# Patient Record
Sex: MALE | Race: BLACK OR AFRICAN AMERICAN | NOT HISPANIC OR LATINO | Employment: FULL TIME | URBAN - METROPOLITAN AREA
[De-identification: names, ages, dates, MRNs, and addresses within clinical notes are randomized per-mention and may not be internally consistent; named-entity substitution may affect disease eponyms.]

---

## 2018-11-30 ENCOUNTER — OFFICE VISIT (OUTPATIENT)
Dept: INTERNAL MEDICINE | Facility: CLINIC | Age: 38
End: 2018-11-30
Payer: COMMERCIAL

## 2018-11-30 VITALS
TEMPERATURE: 99 F | HEART RATE: 56 BPM | DIASTOLIC BLOOD PRESSURE: 70 MMHG | BODY MASS INDEX: 26.15 KG/M2 | WEIGHT: 186.75 LBS | HEIGHT: 71 IN | RESPIRATION RATE: 16 BRPM | SYSTOLIC BLOOD PRESSURE: 104 MMHG

## 2018-11-30 DIAGNOSIS — M54.9 BACK PAIN, UNSPECIFIED BACK LOCATION, UNSPECIFIED BACK PAIN LATERALITY, UNSPECIFIED CHRONICITY: ICD-10-CM

## 2018-11-30 DIAGNOSIS — N52.9 ERECTILE DYSFUNCTION, UNSPECIFIED ERECTILE DYSFUNCTION TYPE: ICD-10-CM

## 2018-11-30 DIAGNOSIS — Z00.00 ANNUAL PHYSICAL EXAM: Primary | ICD-10-CM

## 2018-11-30 PROCEDURE — 90714 TD VACC NO PRESV 7 YRS+ IM: CPT | Mod: S$GLB,,, | Performed by: INTERNAL MEDICINE

## 2018-11-30 PROCEDURE — 90471 IMMUNIZATION ADMIN: CPT | Mod: S$GLB,,, | Performed by: INTERNAL MEDICINE

## 2018-11-30 PROCEDURE — 99385 PREV VISIT NEW AGE 18-39: CPT | Mod: 25,S$GLB,, | Performed by: INTERNAL MEDICINE

## 2018-11-30 PROCEDURE — 99999 PR PBB SHADOW E&M-NEW PATIENT-LVL IV: CPT | Mod: PBBFAC,,, | Performed by: INTERNAL MEDICINE

## 2018-11-30 RX ORDER — SILDENAFIL 100 MG/1
100 TABLET, FILM COATED ORAL DAILY PRN
Qty: 30 TABLET | Refills: 2 | Status: SHIPPED | OUTPATIENT
Start: 2018-11-30 | End: 2019-11-30

## 2018-11-30 RX ORDER — VARENICLINE TARTRATE 1 MG/1
1 TABLET, FILM COATED ORAL 2 TIMES DAILY
Qty: 60 TABLET | Refills: 2 | Status: SHIPPED | OUTPATIENT
Start: 2018-11-30 | End: 2018-12-30

## 2018-11-30 RX ORDER — VARENICLINE TARTRATE 0.5 MG/1
0.5 TABLET, FILM COATED ORAL 2 TIMES DAILY
Qty: 9 TABLET | Refills: 0 | Status: SHIPPED | OUTPATIENT
Start: 2018-11-30

## 2018-11-30 NOTE — PROGRESS NOTES
Subjective:       Patient ID: Chilo Taylor Jr. is a 38 y.o. male.    Chief Complaint: Establish Care and Annual Exam    HPI     38 y.o. male here for annual exam.     Cholesterol: needs  Vaccines: Influenza - refuses; Tetanus - done last a while ago  Sexual Screening:   STD screening: no concern  Eye exam:  Done last 2 months ago  Prostate: no family history of cancer  Colonoscopy: no family history of cancer    Exercise:  No regular exercise, because of work.    Diet:  Mostly home cooked.    He has had ED and premature ejaculation his whole adult life.  He tried kegel exercises.  He went to a urologist with his wife and got checked out.  He cannot keep up with his wife.    History reviewed. No pertinent past medical history.  Past Surgical History:   Procedure Laterality Date    VASECTOMY       Social History     Socioeconomic History    Marital status:      Spouse name: Not on file    Number of children: Not on file    Years of education: Not on file    Highest education level: Not on file   Social Needs    Financial resource strain: Not on file    Food insecurity - worry: Not on file    Food insecurity - inability: Not on file    Transportation needs - medical: Not on file    Transportation needs - non-medical: Not on file   Occupational History    Not on file   Tobacco Use    Smoking status: Current Every Day Smoker     Packs/day: 1.00     Types: Cigarettes    Smokeless tobacco: Never Used   Substance and Sexual Activity    Alcohol use: Yes     Frequency: 2-4 times a month     Drinks per session: 3 or 4     Binge frequency: Less than monthly     Comment: every 2 weeks    Drug use: Yes     Frequency: 7.0 times per week     Types: Marijuana     Comment: daily    Sexual activity: Yes     Partners: Female     Birth control/protection: None     Comment:    Other Topics Concern    Not on file   Social History Narrative    Not on file     Review of patient's allergies  indicates:  No Known Allergies  Chilo Taylor Jr. had no medications administered during this visit.    Review of Systems   Constitutional: Negative for chills, fever and unexpected weight change.   HENT: Negative for congestion, postnasal drip and sore throat.    Eyes: Negative for redness and visual disturbance.   Respiratory: Negative for cough and shortness of breath.    Cardiovascular: Negative for chest pain and palpitations.   Gastrointestinal: Negative for abdominal pain, constipation, diarrhea, nausea and vomiting.   Genitourinary: Negative for dysuria, frequency and hematuria.   Musculoskeletal: Negative for arthralgias and myalgias.   Skin: Negative for color change and rash.   Neurological: Negative for dizziness and headaches.       Objective:      Physical Exam   Constitutional: He is oriented to person, place, and time. He appears well-developed and well-nourished.   HENT:   Head: Normocephalic and atraumatic.   Mouth/Throat: No oropharyngeal exudate.   Eyes: EOM are normal. Pupils are equal, round, and reactive to light. Right eye exhibits no discharge. Left eye exhibits no discharge. No scleral icterus.   Neck: Normal range of motion. Neck supple. No tracheal deviation present. No thyromegaly present.   Cardiovascular: Normal rate, regular rhythm and normal heart sounds. Exam reveals no gallop and no friction rub.   No murmur heard.  Pulmonary/Chest: Effort normal and breath sounds normal. No respiratory distress. He has no wheezes. He has no rales. He exhibits no tenderness.   Abdominal: Soft. Bowel sounds are normal. He exhibits no distension and no mass. There is no tenderness. There is no rebound and no guarding.   Musculoskeletal: Normal range of motion. He exhibits no edema or tenderness.   Neurological: He is alert and oriented to person, place, and time.   Skin: Skin is warm and dry. No rash noted. No erythema. No pallor.   Psychiatric: He has a normal mood and affect. His behavior is  normal.   Vitals reviewed.      Assessment:       1. Annual physical exam    2. Erectile dysfunction, unspecified erectile dysfunction type    3. Back pain, unspecified back location, unspecified back pain laterality, unspecified chronicity        Plan:       1.  Check CBC, CMP, TSH, lipids.  Discussed exercise with patient.  Tetanus vaccine given today.  Refused flu vaccine.  No need for colon or prostate cancer screening at.  2.  Refer to Urology.  Try Viagra 100 mg as needed.  3.  Refer to Sports Medicine.  Check x-ray lumbar spine.

## 2018-12-06 ENCOUNTER — OFFICE VISIT (OUTPATIENT)
Dept: ORTHOPEDICS | Facility: CLINIC | Age: 38
End: 2018-12-06
Payer: COMMERCIAL

## 2018-12-06 VITALS
SYSTOLIC BLOOD PRESSURE: 110 MMHG | BODY MASS INDEX: 26.55 KG/M2 | DIASTOLIC BLOOD PRESSURE: 70 MMHG | WEIGHT: 189.63 LBS | HEIGHT: 71 IN

## 2018-12-06 DIAGNOSIS — M99.03 SOMATIC DYSFUNCTION OF LUMBAR REGION: ICD-10-CM

## 2018-12-06 DIAGNOSIS — M54.59 MECHANICAL LOW BACK PAIN: Primary | ICD-10-CM

## 2018-12-06 DIAGNOSIS — M79.10 MYALGIA: ICD-10-CM

## 2018-12-06 DIAGNOSIS — M99.05 SOMATIC DYSFUNCTION OF PELVIC REGION: ICD-10-CM

## 2018-12-06 DIAGNOSIS — M99.06 SOMATIC DYSFUNCTION OF LOWER EXTREMITY: ICD-10-CM

## 2018-12-06 DIAGNOSIS — M99.04 SACRAL REGION SOMATIC DYSFUNCTION: ICD-10-CM

## 2018-12-06 DIAGNOSIS — M99.02 SOMATIC DYSFUNCTION OF THORACIC REGION: ICD-10-CM

## 2018-12-06 PROCEDURE — 99999 PR PBB SHADOW E&M-EST. PATIENT-LVL II: CPT | Mod: PBBFAC,,, | Performed by: NEUROMUSCULOSKELETAL MEDICINE & OMM

## 2018-12-06 PROCEDURE — 3008F BODY MASS INDEX DOCD: CPT | Mod: CPTII,S$GLB,, | Performed by: NEUROMUSCULOSKELETAL MEDICINE & OMM

## 2018-12-06 PROCEDURE — 98927 OSTEOPATH MANJ 5-6 REGIONS: CPT | Mod: S$GLB,,, | Performed by: NEUROMUSCULOSKELETAL MEDICINE & OMM

## 2018-12-06 PROCEDURE — 99203 OFFICE O/P NEW LOW 30 MIN: CPT | Mod: 25,S$GLB,, | Performed by: NEUROMUSCULOSKELETAL MEDICINE & OMM

## 2018-12-06 PROCEDURE — 97110 THERAPEUTIC EXERCISES: CPT | Mod: GP,S$GLB,, | Performed by: NEUROMUSCULOSKELETAL MEDICINE & OMM

## 2018-12-06 NOTE — PROGRESS NOTES
Subjective:     Chilo Taylor Jr.    Chief Complaint   Patient presents with    Back Pain       HPI    Chilo is coming in today for low back pain that began 3 year(s) ago, referred by Dr. Nelson. He initially injured his back while bending to  a hose. He had severe pain in his back at that time. Since then, he feels it in his back whenever he tries to exercise. Pt. describes the pain as a 0/10 currently, 7-8/10 at times with exercise. It is a sharp achy pain that does not radiate. There was not a fall/injury/ or trauma associated with the onset of symptoms. The pain is better with rest and worse with bending, lifting, working out. Pt. Denies any other musculoskeletal complaints at this time. 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     Review of Systems   Constitutional: Negative for chills and fever.   HENT: Negative for hearing loss and tinnitus.    Eyes: Negative for blurred vision and photophobia.   Respiratory: Negative for cough and shortness of breath.    Cardiovascular: Negative for chest pain and leg swelling.   Gastrointestinal: Negative for abdominal pain, heartburn, nausea and vomiting.   Genitourinary: Negative for dysuria and hematuria.   Musculoskeletal: Positive for back pain, joint pain, myalgias and neck pain. Negative for falls.   Skin: Negative for rash.   Neurological: Negative for dizziness, tingling, focal weakness, weakness and headaches.   Endo/Heme/Allergies: Positive for environmental allergies. Does not bruise/bleed easily.   Psychiatric/Behavioral: Negative for depression. The patient is not nervous/anxious.        PAST MEDICAL HISTORY: History reviewed. No pertinent past medical history.  PAST SURGICAL HISTORY:   Past Surgical History:   Procedure Laterality Date    VASECTOMY       FAMILY HISTORY:   Family History   Problem Relation Age of Onset    Diabetes Mother     Hypertension Mother     Hypertension Father     Hyperlipidemia Father      "Heart disease Paternal Grandmother         MI in her sleep    Cancer Neg Hx     Stroke Neg Hx      SOCIAL HISTORY:   Social History     Socioeconomic History    Marital status:      Spouse name: Not on file    Number of children: Not on file    Years of education: Not on file    Highest education level: Not on file   Social Needs    Financial resource strain: Not on file    Food insecurity - worry: Not on file    Food insecurity - inability: Not on file    Transportation needs - medical: Not on file    Transportation needs - non-medical: Not on file   Occupational History    Not on file   Tobacco Use    Smoking status: Current Every Day Smoker     Packs/day: 1.00     Types: Cigarettes    Smokeless tobacco: Never Used   Substance and Sexual Activity    Alcohol use: Yes     Frequency: 2-4 times a month     Drinks per session: 3 or 4     Binge frequency: Less than monthly     Comment: every 2 weeks    Drug use: Yes     Frequency: 7.0 times per week     Types: Marijuana     Comment: daily    Sexual activity: Yes     Partners: Female     Birth control/protection: None     Comment:    Other Topics Concern    Not on file   Social History Narrative    Not on file       MEDICATIONS:   Current Outpatient Medications:     sildenafil (VIAGRA) 100 MG tablet, Take 1 tablet (100 mg total) by mouth daily as needed for Erectile Dysfunction., Disp: 30 tablet, Rfl: 2    varenicline (CHANTIX) 0.5 MG Tab, Take 1 tablet (0.5 mg total) by mouth 2 (two) times daily., Disp: 9 tablet, Rfl: 0    varenicline (CHANTIX) 1 mg Tab, Take 1 tablet (1 mg total) by mouth 2 (two) times daily., Disp: 60 tablet, Rfl: 2  ALLERGIES: Review of patient's allergies indicates:  No Known Allergies    Office note from Dr. Nelson on 11/30/18 reviewed: referral for sports medicine for low back pain    Objective:     VITAL SIGNS: /70   Ht 5' 10.5" (1.791 m)   Wt 86 kg (189 lb 9.5 oz)   BMI 26.82 kg/m²    General    Nursing " note and vitals reviewed.  Constitutional: He is oriented to person, place, and time. He appears well-developed and well-nourished.   HENT:   Head: Normocephalic and atraumatic.   no nasal discharge, no external ear redness or discharge   Eyes:   EOM is full and smooth  No eye redness or discharge   Neck: Neck supple. No tracheal deviation present.   Cardiovascular: Normal rate.    2+ Radial pulse bilaterally  2+ Dorsalis Pedis pulse bilaterally  No LE edema appreciated   Pulmonary/Chest: Effort normal. No respiratory distress.   Abdominal: He exhibits no distension.   No rigidity   Neurological: He is alert and oriented to person, place, and time. He exhibits normal muscle tone. Coordination normal.   See details below   Psychiatric: He has a normal mood and affect. His behavior is normal.                 MUSCULOSKELETAL EXAM:     Lumbar Spine: right lumbar region    Observation:    Posture:  Upright and Posterior pelvis tilt with loss of lumbar lordosis  No obvious pelvic obliquity while standing.    No edema, erythema, or ecchymosis noted in lumbosacral region.    No midline skin abnormalities.    No atrophy of lower limb musculature.  Leg lengths symmetric.  Gait: Non-antalgic with Neutral ankle mechanics and Neutral medial arch. Gait without trendelenberg, heel walking, toe walking, and tandem walking.    Tenderness:  No tenderness throughout the lumbar spine, iliolumbar region, posterior pelvis.  + tenderness at right QL insertion at TL junction  No tenderness over the sacrum, piriformis, greater/lesser trochanters.  No bony deformities or step-offs palpated.     Strength Testing (* = with pain):  Hip flexion - 5/5 on left and 5/5 on right  Hip extension - 5/5 on left and 5/5 on right  Knee flexion - 5/5 on left and 5/5 on right  Knee extension - 5/5 on left and 5/5 on right  Dorsiflexion - 5/5 on left and 5/5 on right  Plantarflexion - 5/5 on left and 5/5 on right  Great toe extension - 5/5 on left and 5/5 on  right    Special Tests:  Resisted axial rotation - negative on left and negative on right    Seated straight leg raise - negative on left and negative on right  Supine straight leg raise - negative on left and negative on right   Slump test - negative on left and negative on right  Provocation maneuvers exhibit no worsening of symptoms.    KAREN test - negative  FADIR test - negative  Log roll test - negative    Structural Exam:  TART (Tissue texture abnormality, Asymmetry,  Restriction of motion and/or Tenderness) changes:     Thoracic Spine   T1 Neutral   T2 Neutral   T3 Neutral   T4 Neutral   T5 Neutral   T6 Neutral   T7 Neutral   T8 ERS RIGHT   T9 FRS RIGHT   T10 NS-left,R-right   T11 NS-left,R-right   T12 NS-left,R-right   Right QL TTA    Rib cage: neutral     Lumbar Spine   L1 NS-left,R-right   L2 NS-left,R-right   L3 NS-left,R-right   L4 ERS RIGHT   L5 ERS RIGHT     Pelvis:  · Innominate:Neutral  · Pubic bone:Right inferior pubic shear    Sacrum:Right on Left sacral torsion     Lower Extremity:  · Leg lengths symmetric    Location/joint Finding/restriction   Fibular head Neutral   Tibia Neutral   Talocrural joint Bilateral anterior talus    Subtalar Joint Neutral   Cuboid Neutral   Talo-navicular joint Neutral   Navicular-cuneiform joint Left   1st, 2nd, 3rd, 4th, 5th Cuneiform-metatarsal joint Neutral   1st, 2nd, 3rd, 4th, 5th metatarsal Neutral   1st, 2nd, 3rd, 4th, 5th phalange Neutral       Key   F= Flexed   E = Extended   R = Rotated   S = Sidebent   TTA = tissue texture abnormality       Neurovascular Exam:  Reflexes +2/4 and symmetric at the L4 and S1 dermatomes.    Sensation intact to light touch in the L2-S2 dermatomes bilaterally.   Babinski response downgoing bilaterally.  No pretibial edema or abnormal hair pattern of the shin.    Intact and symmetric DP and PT pulses bilaterally.      Assessment:      Encounter Diagnoses   Name Primary?    Mechanical low back pain Yes    Myalgia     Somatic  dysfunction of thoracic region     Somatic dysfunction of lumbar region     Sacral region somatic dysfunction     Somatic dysfunction of pelvic region     Somatic dysfunction of lower extremity           Plan:      1. Mechanical low back pain with compensatory muscle firing  - OMT performed today and HEP started.   - Educated patient on proper lifting mechanics    2. OMT 5-6 regions. Oral consent obtained.  Reviewed benefits and potential side effects.   - OMT indicated today due to signs and symptoms as well as local and remote somatic dysfunction findings and their related neurokinetic, lymphatic, fascial and/or arteriovenous body connections.   - OMT techniques used: Myofascial Release, Muscle Energy and Articulatory   - Treatment was tolerated well. Improvement noted in segmental mobility post-treatment in dysfunctional regions. There were no adverse events and no complications immediately following treatment.     3. Pt. Given the following HEP:  A)  Pelvic clock exercises given to do from the 6-12 o'clock positions:10-15 reps, twice daily. Hand out of exercise also given.   B) Seated anterior pelvic tilt exercise: rotate pelvis forward to sit on ischial tuberosities while maintaining neutral shoulder positioning. Repeat frequently throughout the day.   C) Quadratus lumborum self-stretch on all fours: hold stretch for 30 seconds, repeating 2-3 times on each side. Do stretch twice daily. Hand-out also given.     [address] HOME EXERCISE PROGRAM (HEP):  The patient was taught a homegoing physical therapy regimen as described above. The patient demonstrated understanding of the exercises and proper technique of their execution. This interaction took 15 minutes.     4. Follow-up in 2 weeks for reevaluation    5. Patient agreeable to today's plan and all questions were answered

## 2018-12-06 NOTE — LETTER
December 7, 2018      Misael Nelson MD  [address] [facility] [place] [address]           [facility]  [address] [facility] [address]  Phone: [phone]          Patient: Chilo Taylor Jr.   MR Number: [id number]   YOB: 1980   Date of Visit: 12/6/2018       Dear Dr. Misael Nelson:    Thank you for referring Chilo Taylor to me for evaluation. Attached you will find relevant portions of my assessment and plan of care.    If you have questions, please do not hesitate to call me. I look forward to following Chilo Taylor along with you.    Sincerely,    Zuleyka Berger, DO    Enclosure  CC:  No Recipients    If you would like to receive this communication electronically, please contact [email or web address] or [phone] to request more information on [employer] Link access.    For providers and/or their staff who would like to refer a patient to [facility], please contact us through our one-stop-shop provider referral line, [facility], at [phone].    If you feel you have received this communication in error or would no longer like to receive these types of communications, please e-mail [email or web address]

## 2018-12-17 ENCOUNTER — OFFICE VISIT (OUTPATIENT)
Dept: UROLOGY | Facility: CLINIC | Age: 38
End: 2018-12-17
Payer: COMMERCIAL

## 2018-12-17 VITALS — HEIGHT: 71 IN | BODY MASS INDEX: 26.23 KG/M2 | WEIGHT: 187.38 LBS

## 2018-12-17 DIAGNOSIS — F52.4 PREMATURE EJACULATION: Primary | ICD-10-CM

## 2018-12-17 PROCEDURE — 99999 PR PBB SHADOW E&M-EST. PATIENT-LVL III: CPT | Mod: PBBFAC,,, | Performed by: PHYSICIAN ASSISTANT

## 2018-12-17 PROCEDURE — 99204 OFFICE O/P NEW MOD 45 MIN: CPT | Mod: S$GLB,,, | Performed by: PHYSICIAN ASSISTANT

## 2018-12-17 PROCEDURE — 3008F BODY MASS INDEX DOCD: CPT | Mod: CPTII,S$GLB,, | Performed by: PHYSICIAN ASSISTANT

## 2018-12-17 RX ORDER — PAROXETINE HYDROCHLORIDE 20 MG/1
20 TABLET, FILM COATED ORAL
Qty: 15 TABLET | Refills: 11 | Status: SHIPPED | OUTPATIENT
Start: 2018-12-17 | End: 2019-12-17

## 2018-12-17 NOTE — LETTER
December 17, 2018      Misael Nelson MD  [address] [facility] [place] [address]           [facility] 4th Floor  [address]  [facility] [address]  Phone: [phone]          Patient: Chilo Taylor Jr.   MR Number: [id number]   YOB: 1980   Date of Visit: 12/17/2018       Dear Dr. Misael Nelson:    Thank you for referring Chilo Taylor to me for evaluation. Attached you will find relevant portions of my assessment and plan of care.    If you have questions, please do not hesitate to call me. I look forward to following Chilo Taylor along with you.    Sincerely,    Migdalia Davis PA-C    Enclosure  CC:  No Recipients    If you would like to receive this communication electronically, please contact [email or web address] or [phone] to request more information on [employer] Link access.    For providers and/or their staff who would like to refer a patient to [facility], please contact us through our one-stop-shop provider referral line, [facility], at [phone].    If you feel you have received this communication in error or would no longer like to receive these types of communications, please e-mail [email or web address]

## 2018-12-17 NOTE — PROGRESS NOTES
CHIEF COMPLAINT:    Mr. Taylor is a 38 y.o. male presenting for premature ejaculation.    PRESENTING ILLNESS:    Chilo Taylor Jr. is a 38 y.o. male  who presents for ED and premature ejaculation.   His symptoms started when he was 24 or 25.  He saw a urologist at that time.  No treatment was offered at that time.  He thought that it would improve over time but it hasnt.  He got a prescription for viagra but he did not fill it due to the cost.    He is able to achieve an erection that is hard enough for penetration.  After 2 minutes of intercourse he ejaculates.  He has not taken anything for premature ejaculation.    He had a vasectomy in 2005.  Testosterone (11/30/18): 525  He is smoker.    He denies HTN and DM.    He has high triglycerides and low HDL, lipid panel otherwise normal (11/30/18).    He does not have any urinary complaints.  He denies hesitancy, intermittency and dysuria.        REVIEW OF SYSTEMS:    Constitutional: Negative for fever and chills.   HENT: Negative for hearing loss.   Eyes: Negative for visual disturbance.   Respiratory: Negative for shortness of breath.   Cardiovascular: Negative for chest pain.   Gastrointestinal: Negative for vomiting, and constipation.   Genitourinary: See HPI  Neurological: Negative for dizziness.   Hematological: Does not bruise/bleed easily.   Psychiatric/Behavioral: Negative for confusion.       PATIENT HISTORY:    History reviewed. No pertinent past medical history.    Past Surgical History:   Procedure Laterality Date    VASECTOMY         Family History   Problem Relation Age of Onset    Diabetes Mother     Hypertension Mother     Hypertension Father     Hyperlipidemia Father     Heart disease Paternal Grandmother         MI in her sleep    Cancer Neg Hx     Stroke Neg Hx        Social History     Socioeconomic History    Marital status:      Spouse name: Not on file    Number of children: Not on file    Years of education: Not on  file    Highest education level: Not on file   Social Needs    Financial resource strain: Not on file    Food insecurity - worry: Not on file    Food insecurity - inability: Not on file    Transportation needs - medical: Not on file    Transportation needs - non-medical: Not on file   Occupational History    Not on file   Tobacco Use    Smoking status: Current Every Day Smoker     Packs/day: 1.00     Types: Cigarettes    Smokeless tobacco: Never Used   Substance and Sexual Activity    Alcohol use: Yes     Frequency: 2-4 times a month     Drinks per session: 3 or 4     Binge frequency: Less than monthly     Comment: every 2 weeks    Drug use: Yes     Frequency: 7.0 times per week     Types: Marijuana     Comment: daily    Sexual activity: Yes     Partners: Female     Birth control/protection: None     Comment:    Other Topics Concern    Not on file   Social History Narrative    Not on file       Allergies:  Patient has no known allergies.    Medications:    Current Outpatient Medications:     paroxetine (PAXIL) 20 MG tablet, Take 1 tablet (20 mg total) by mouth as needed. Take 1 tablet by mouth 3-4 hours before intercourse., Disp: 15 tablet, Rfl: 11    sildenafil (VIAGRA) 100 MG tablet, Take 1 tablet (100 mg total) by mouth daily as needed for Erectile Dysfunction., Disp: 30 tablet, Rfl: 2    varenicline (CHANTIX) 0.5 MG Tab, Take 1 tablet (0.5 mg total) by mouth 2 (two) times daily., Disp: 9 tablet, Rfl: 0    varenicline (CHANTIX) 1 mg Tab, Take 1 tablet (1 mg total) by mouth 2 (two) times daily., Disp: 60 tablet, Rfl: 2    PHYSICAL EXAMINATION:    Constitutional: He appears well-developed and well-nourished.  He is in no apparent distress.    Eyes: No scleral icterus noted bilaterally. No discharge bilaterally.    Nose: No rhinorrhea    Cardiovascular: Normal rate.  No pitting edema noted in lower extremities bilaterally    Pulmonary/Chest: Effort normal. No respiratory distress.      Abdominal:  He exhibits no distension.  There is no CVA tenderness.     Lymphadenopathy:        Right: No supraclavicular adenopathy present.        Left: No supraclavicular adenopathy present.     Neurological: He is alert and oriented to person, place, and time.     Skin: Skin is warm and dry.     Psych: Cooperative with normal affect.    Physical Exam      LABS:    No results found for: PSA, PSADIAG, PSATOTAL, PSAFREE, PSAFREEPCT    IMPRESSION:    Encounter Diagnoses   Name Primary?    Premature ejaculation Yes         PLAN:    Discussed factors that affect erectile dysfunction including low testosterone, obesity, HLD, HTN, DM and smoking.  Trial of paxil. Patient informed that it is a low dose anti depressant. Side effects discussed.      Follow up in 6 weeks         Migdalia Davis PA-C

## 2019-02-26 ENCOUNTER — OFFICE VISIT (OUTPATIENT)
Dept: URGENT CARE | Facility: CLINIC | Age: 39
End: 2019-02-26
Payer: COMMERCIAL

## 2019-02-26 VITALS
WEIGHT: 175 LBS | HEART RATE: 73 BPM | HEIGHT: 71 IN | BODY MASS INDEX: 24.5 KG/M2 | RESPIRATION RATE: 18 BRPM | SYSTOLIC BLOOD PRESSURE: 127 MMHG | DIASTOLIC BLOOD PRESSURE: 76 MMHG | OXYGEN SATURATION: 99 % | TEMPERATURE: 98 F

## 2019-02-26 DIAGNOSIS — S09.90XA TRAUMATIC INJURY OF HEAD, INITIAL ENCOUNTER: Primary | ICD-10-CM

## 2019-02-26 PROCEDURE — 99214 PR OFFICE/OUTPT VISIT, EST, LEVL IV, 30-39 MIN: ICD-10-PCS | Mod: S$GLB,,, | Performed by: NURSE PRACTITIONER

## 2019-02-26 PROCEDURE — 3008F PR BODY MASS INDEX (BMI) DOCUMENTED: ICD-10-PCS | Mod: CPTII,S$GLB,, | Performed by: NURSE PRACTITIONER

## 2019-02-26 PROCEDURE — 99214 OFFICE O/P EST MOD 30 MIN: CPT | Mod: S$GLB,,, | Performed by: NURSE PRACTITIONER

## 2019-02-26 PROCEDURE — 3008F BODY MASS INDEX DOCD: CPT | Mod: CPTII,S$GLB,, | Performed by: NURSE PRACTITIONER

## 2019-02-26 RX ORDER — ONDANSETRON 4 MG/1
4 TABLET, ORALLY DISINTEGRATING ORAL EVERY 6 HOURS PRN
Qty: 20 TABLET | Refills: 0 | Status: SHIPPED | OUTPATIENT
Start: 2019-02-26

## 2019-02-26 NOTE — PROGRESS NOTES
"Subjective:       Patient ID: Chilo Taylor Jr. is a 38 y.o. male.    Vitals:  height is 5' 11" (1.803 m) and weight is 79.4 kg (175 lb). His temperature is 98.4 °F (36.9 °C). His blood pressure is 127/76 and his pulse is 73. His respiration is 18 and oxygen saturation is 99%.     Chief Complaint: Concussion    Patient was drinking heavily Sunday and fell and hit his head, patient doesn't remember anything after the fact. Doesn't recall last night or Sunday night and slept all day yesterday. He can't keep down fluids.  He states he is still not back to full cognitive functioning.      Head Injury    The incident occurred 2 days ago. The injury mechanism was a fall. Length of episode of loss of consciousness: Patient doesn't know if he lost consciousness. There was no blood loss. Associated symptoms include disorientation and vomiting. He has tried nothing for the symptoms.       Constitution: Positive for chills and fatigue.   Gastrointestinal: Positive for vomiting.   Neurological: Positive for disorientation.   Psychiatric/Behavioral: Positive for disorientation and confusion.       Objective:      Physical Exam   Constitutional: He is oriented to person, place, and time. He appears well-developed and well-nourished. He is cooperative.  Non-toxic appearance. He does not appear ill. No distress.   HENT:   Head: Normocephalic and atraumatic. Head is without abrasion, without contusion and without laceration.   Right Ear: Hearing, tympanic membrane, external ear and ear canal normal. No hemotympanum.   Left Ear: Hearing, tympanic membrane, external ear and ear canal normal. No hemotympanum.   Nose: Nose normal. No mucosal edema, rhinorrhea or nasal deformity. No epistaxis. Right sinus exhibits no maxillary sinus tenderness and no frontal sinus tenderness. Left sinus exhibits no maxillary sinus tenderness and no frontal sinus tenderness.   Mouth/Throat: Uvula is midline, oropharynx is clear and moist and " mucous membranes are normal. No trismus in the jaw. Normal dentition. No uvula swelling. No posterior oropharyngeal erythema.   Eyes: Conjunctivae, EOM and lids are normal. Pupils are equal, round, and reactive to light. Right eye exhibits no discharge. Left eye exhibits no discharge. No scleral icterus.   Sclera clear bilat   Neck: Trachea normal, normal range of motion, full passive range of motion without pain and phonation normal. Neck supple. No spinous process tenderness and no muscular tenderness present. No neck rigidity. No tracheal deviation present.   Cardiovascular: Normal rate, regular rhythm, normal heart sounds, intact distal pulses and normal pulses.   Pulmonary/Chest: Effort normal and breath sounds normal. No respiratory distress.   Abdominal: Soft. Normal appearance and bowel sounds are normal. He exhibits no distension, no pulsatile midline mass and no mass. There is no tenderness.   Musculoskeletal: Normal range of motion. He exhibits no edema or deformity.   Neurological: He is alert and oriented to person, place, and time. He has normal strength. No cranial nerve deficit or sensory deficit. He exhibits normal muscle tone. He displays no seizure activity. Coordination normal. GCS eye subscore is 4. GCS verbal subscore is 5. GCS motor subscore is 6.   Skin: Skin is warm, dry and intact. Capillary refill takes less than 2 seconds. No abrasion, no bruising, no burn, no ecchymosis and no laceration noted. He is not diaphoretic. No pallor.   Psychiatric: He has a normal mood and affect. His speech is normal and behavior is normal. Judgment and thought content normal. Cognition and memory are normal.   Nursing note and vitals reviewed.      Assessment:       1. Traumatic injury of head, initial encounter        Plan:         Traumatic injury of head, initial encounter  -     CT Head W Wo Contrast; Future; Expected date: 02/26/2019    Other orders  -     ondansetron (ZOFRAN-ODT) 4 MG TbDL; Take 1  "tablet (4 mg total) by mouth every 6 (six) hours as needed.  Dispense: 20 tablet; Refill: 0      Neurologically intact.  Pt is not disoriented and is aox4 but he "feels off."  Will send for CT scan as pt is within 48 hors of incident.  Observation at home instructions below given to his wife.        Discharge Instructions for Concussion  You have been diagnosed with a concussion, a type of brain injury caused by a sudden impact to your head. It can also be caused by sudden movement of your brain inside your head, such as from forceful shaking. Some concussions are mild. Most people recover completely from mild concussions. But recovery may take days, weeks, or months. For some, symptoms may last even longer. Early care and monitoring are important to prevent long-term complications.  Home care  Do's and don'ts:   · Ask a friend or family member to stay with you for a few days. You should not be alone until you know how the injury has affected you.  · Tell your caregiver to wake you every 2 to 3 hours during the first night. Your caregiver should call 911 if he or she cant wake you, or if you are confused.  · Dont take any medicine--not even aspirin--unless your healthcare provider says it's OK. If you have a headache, try placing a cold, damp cloth on your forehead.  · Eat light. Clear liquids, such as broth or gelatin, are a good choice.  · Don't drink alcohol or use any recreational drugs.   · Don't return to sports or any activity that could cause you to hit your head until all symptoms are gone and you have been cleared by your doctor. A second head injury before full recovery from the first one can lead to serious brain injury.  · Avoid activities that require a lot of concentration or attention. This will allow your brain to rest and heal more quickly.  The best way to recover is to discuss symptoms with your healthcare provider and your family. Work closely with your healthcare provider and give your brain " time to heal.  Follow-up care  Follow up with your healthcare provider, or as advised.     When to call your healthcare provider  Your caregiver should call 911 right away if you have fallen asleep, cannot be awakened, or you are confused.  Otherwise, call your healthcare provider right away if any of these occur:  · Vomiting  · Clear or bloody drainage from your nose or ear  · Constant drowsiness or trouble waking up  · Confusion or memory loss  · Blurred vision  · Trouble walking, talking, or concentrating  · Increased weakness or problems with coordination  · Constant headache that cant be relieved or gets worse  · Changes in behavior or personality   Date Last Reviewed: 11/5/2015  © 7225-9893 [employer]. [address] [place] [address], [place] [address]. All rights reserved. This information is not intended as a substitute for professional medical care. Always follow your healthcare professional's instructions.  Please return here or go to the Emergency Department for any concerns or worsening of condition.  If you were prescribed antibiotics, please take them to completion.  If you were prescribed a narcotic medication, do not drive or operate heavy equipment or machinery while taking these medications.  Please follow up with your primary care doctor or specialist as needed.    If you  smoke, please stop smoking.

## 2019-02-26 NOTE — PATIENT INSTRUCTIONS
Discharge Instructions for Concussion  You have been diagnosed with a concussion, a type of brain injury caused by a sudden impact to your head. It can also be caused by sudden movement of your brain inside your head, such as from forceful shaking. Some concussions are mild. Most people recover completely from mild concussions. But recovery may take days, weeks, or months. For some, symptoms may last even longer. Early care and monitoring are important to prevent long-term complications.  Home care  Do's and don'ts:   · Ask a friend or family member to stay with you for a few days. You should not be alone until you know how the injury has affected you.  · Tell your caregiver to wake you every 2 to 3 hours during the first night. Your caregiver should call 911 if he or she cant wake you, or if you are confused.  · Dont take any medicine--not even aspirin--unless your healthcare provider says it's OK. If you have a headache, try placing a cold, damp cloth on your forehead.  · Eat light. Clear liquids, such as broth or gelatin, are a good choice.  · Don't drink alcohol or use any recreational drugs.   · Don't return to sports or any activity that could cause you to hit your head until all symptoms are gone and you have been cleared by your doctor. A second head injury before full recovery from the first one can lead to serious brain injury.  · Avoid activities that require a lot of concentration or attention. This will allow your brain to rest and heal more quickly.  The best way to recover is to discuss symptoms with your healthcare provider and your family. Work closely with your healthcare provider and give your brain time to heal.  Follow-up care  Follow up with your healthcare provider, or as advised.     When to call your healthcare provider  Your caregiver should call 911 right away if you have fallen asleep, cannot be awakened, or you are confused.  Otherwise, call your healthcare provider right away if any  of these occur:  · Vomiting  · Clear or bloody drainage from your nose or ear  · Constant drowsiness or trouble waking up  · Confusion or memory loss  · Blurred vision  · Trouble walking, talking, or concentrating  · Increased weakness or problems with coordination  · Constant headache that cant be relieved or gets worse  · Changes in behavior or personality   Date Last Reviewed: 11/5/2015  © 0311-1560 [employer]. [address] [place] [address], [place] [address]. All rights reserved. This information is not intended as a substitute for professional medical care. Always follow your healthcare professional's instructions.  Please return here or go to the Emergency Department for any concerns or worsening of condition.  If you were prescribed antibiotics, please take them to completion.  If you were prescribed a narcotic medication, do not drive or operate heavy equipment or machinery while taking these medications.  Please follow up with your primary care doctor or specialist as needed.    If you  smoke, please stop smoking.

## 2024-07-15 ENCOUNTER — OFFICE VISIT (OUTPATIENT)
Dept: URGENT CARE | Facility: CLINIC | Age: 44
End: 2024-07-15
Payer: COMMERCIAL

## 2024-07-15 VITALS
HEART RATE: 61 BPM | RESPIRATION RATE: 16 BRPM | DIASTOLIC BLOOD PRESSURE: 71 MMHG | TEMPERATURE: 98 F | SYSTOLIC BLOOD PRESSURE: 128 MMHG

## 2024-07-15 DIAGNOSIS — L25.9 CONTACT DERMATITIS, UNSPECIFIED CONTACT DERMATITIS TYPE, UNSPECIFIED TRIGGER: Primary | ICD-10-CM

## 2024-07-15 DIAGNOSIS — M79.645 FINGER PAIN, LEFT: ICD-10-CM

## 2024-07-15 PROCEDURE — 99213 OFFICE O/P EST LOW 20 MIN: CPT | Mod: S$GLB,,, | Performed by: NURSE PRACTITIONER

## 2024-07-15 RX ORDER — CLOTRIMAZOLE AND BETAMETHASONE DIPROPIONATE 10; .64 MG/G; MG/G
CREAM TOPICAL 2 TIMES DAILY
Qty: 45 G | Refills: 0 | Status: SHIPPED | OUTPATIENT
Start: 2024-07-15

## 2024-07-15 RX ORDER — TADALAFIL 10 MG/1
10 TABLET ORAL DAILY PRN
COMMUNITY

## 2024-07-16 ENCOUNTER — HOSPITAL ENCOUNTER (OUTPATIENT)
Dept: RADIOLOGY | Facility: HOSPITAL | Age: 44
Discharge: HOME | End: 2024-07-16
Attending: NURSE PRACTITIONER
Payer: COMMERCIAL

## 2024-07-16 DIAGNOSIS — M79.645 FINGER PAIN, LEFT: ICD-10-CM

## 2024-07-16 PROCEDURE — 73130 X-RAY EXAM OF HAND: CPT | Mod: TC,PN,LT

## 2024-07-16 PROCEDURE — 73130 X-RAY EXAM OF HAND: CPT | Mod: 26,LT,, | Performed by: INTERNAL MEDICINE

## 2024-07-16 NOTE — PATIENT INSTRUCTIONS
Use an unscented cream or lotion to keep your skin moist.  Drink plenty of fluids to keep your body hydrated.  Bathe with cool or warm water. Do not use hot water. Pat yourself dry with a clean, thick, soft towel. Use mild and unscented soap, moisturizers, and deodorants.  At-home care to help with scratching:  Wear gloves to protect skin on your hands. Try wearing cotton gloves under plastic gloves. Remove both sets of gloves from time to time to prevent sweating.  Keep nails short and clean.  If you scratch in your sleep, wear white cotton gloves to bed.  Try using cool compresses on the skin. They may help with swelling and itching. Dip a cloth in cold water and put it right on your itchy skin.

## 2024-07-16 NOTE — PROGRESS NOTES
Subjective:      Patient ID: Chilo Taylor Jr. is a 43 y.o. male.    Vitals:  temporal temperature is 98.3 °F (36.8 °C). His blood pressure is 128/71 and his pulse is 61. His respiration is 16.     Chief Complaint: Hand Injury (Left index finger)    Patient reports with c/o skin peeling on both of his hands from fishing and an injury from cabbage ball on his left index finger. The index finger is swelling with some discoloration. The peeling started on Saturday and the finger injury occurred yesterday.    Hand Injury   His dominant hand is their right hand. The incident occurred 12 to 24 hours ago. [place]. Injury mechanism: trying to catch a ball. The pain is present in the left fingers (index). The pain is at a severity of 9/10. The pain is severe. The pain has been Fluctuating since the incident. Associated symptoms include muscle weakness. Pertinent negatives include no chest pain, numbness or tingling. The symptoms are aggravated by movement. He has tried nothing for the symptoms. The treatment provided no relief.       Constitution: Negative.   Neck: neck negative.   Cardiovascular: Negative.  Negative for chest pain.   Respiratory: Negative.     Musculoskeletal:  Positive for joint pain and joint swelling.   Skin:  Positive for rash.   Neurological:  Negative for numbness.      Objective:     Physical Exam   Constitutional: No distress.   HENT:   Head: Normocephalic.   Pulmonary/Chest: Effort normal and breath sounds normal.   Abdominal: Normal appearance.   Musculoskeletal:         General: Swelling, tenderness and signs of injury present. No deformity.      Comments: + L index finger  and 5th finger swollen, no erythema, no warmth   Neurological: He is alert.   Skin: Skin is warm, dry and rash.         Comments: + skin peeling to bilat hands       Assessment:     1. Contact dermatitis, unspecified contact dermatitis type, unspecified trigger    2. Finger pain, left         Plan:       Contact dermatitis, unspecified contact dermatitis type, unspecified trigger  -     clotrimazole-betamethasone 1-0.05% (LOTRISONE) cream; Apply topically 2 (two) times daily.  Dispense: 45 g; Refill: 0    Finger pain, left  -     X-Ray Hand 3 view Left; Future; Expected date: 07/15/2024      Patient Instructions   Use an unscented cream or lotion to keep your skin moist.  Drink plenty of fluids to keep your body hydrated.  Bathe with cool or warm water. Do not use hot water. Pat yourself dry with a clean, thick, soft towel. Use mild and unscented soap, moisturizers, and deodorants.  At-home care to help with scratching:  Wear gloves to protect skin on your hands. Try wearing cotton gloves under plastic gloves. Remove both sets of gloves from time to time to prevent sweating.  Keep nails short and clean.  If you scratch in your sleep, wear white cotton gloves to bed.  Try using cool compresses on the skin. They may help with swelling and itching. Dip a cloth in cold water and put it right on your itchy skin.